# Patient Record
Sex: MALE | Race: BLACK OR AFRICAN AMERICAN | Employment: PART TIME | URBAN - METROPOLITAN AREA
[De-identification: names, ages, dates, MRNs, and addresses within clinical notes are randomized per-mention and may not be internally consistent; named-entity substitution may affect disease eponyms.]

---
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
## 2023-06-08 DIAGNOSIS — L20.84 INTRINSIC ATOPIC DERMATITIS: ICD-10-CM

## 2023-06-08 RX ORDER — DUPILUMAB 300 MG/2ML
INJECTION, SOLUTION SUBCUTANEOUS
Qty: 4 EACH | Refills: 2 | Status: SHIPPED | OUTPATIENT
Start: 2023-06-08

## 2023-06-08 NOTE — TELEPHONE ENCOUNTER
Dedrick Izaguirre is calling to request a refill on the following medication(s):    Medication Request:  Requested Prescriptions     Pending Prescriptions Disp Refills    dupilumab (DUPIXENT) 300 MG/2ML SOSY injection 4 each 2     Sig: Inject 2 mL, subcutaneously, every 2 weeks.        Last Visit Date (If Applicable):  Visit date not found    Next Visit Date:    Visit date not found

## 2024-07-19 ENCOUNTER — TELEPHONE (OUTPATIENT)
Dept: DERMATOLOGY | Age: 19
End: 2024-07-19

## 2024-08-16 ENCOUNTER — OFFICE VISIT (OUTPATIENT)
Dept: DERMATOLOGY | Age: 19
End: 2024-08-16
Payer: COMMERCIAL

## 2024-08-16 VITALS
HEIGHT: 66 IN | DIASTOLIC BLOOD PRESSURE: 67 MMHG | HEART RATE: 81 BPM | BODY MASS INDEX: 23.63 KG/M2 | OXYGEN SATURATION: 97 % | WEIGHT: 147 LBS | TEMPERATURE: 98.2 F | SYSTOLIC BLOOD PRESSURE: 124 MMHG

## 2024-08-16 DIAGNOSIS — L20.84 INTRINSIC ATOPIC DERMATITIS: Primary | ICD-10-CM

## 2024-08-16 DIAGNOSIS — L30.9 FACIAL ECZEMA: ICD-10-CM

## 2024-08-16 PROCEDURE — 99214 OFFICE O/P EST MOD 30 MIN: CPT | Performed by: PHYSICIAN ASSISTANT

## 2024-08-16 RX ORDER — CLOBETASOL PROPIONATE 0.5 MG/G
OINTMENT TOPICAL
Qty: 60 G | Refills: 2 | Status: SHIPPED | OUTPATIENT
Start: 2024-08-16

## 2024-08-16 RX ORDER — TRIAMCINOLONE ACETONIDE 1 MG/G
OINTMENT TOPICAL
Qty: 454 G | Refills: 2 | Status: SHIPPED | OUTPATIENT
Start: 2024-08-16

## 2024-08-16 RX ORDER — HYDROXYZINE HYDROCHLORIDE 10 MG/1
TABLET, FILM COATED ORAL
COMMUNITY
Start: 2024-07-29

## 2024-08-16 RX ORDER — TACROLIMUS 1 MG/G
OINTMENT TOPICAL
Qty: 30 G | Refills: 2 | Status: SHIPPED | OUTPATIENT
Start: 2024-08-16

## 2024-08-16 NOTE — PROGRESS NOTES
Dermatology Patient Note  [facility], [facility] DERMATOLOGY  [address]  [address]  [facility] [address]  Dept: [phone]  Dept Fax: [phone]      VISITDATE: 8/16/2024   REFERRING PROVIDER: No ref. provider found      Aubrey Stuart is a 19 y.o. male  who presents today in the office for:    Other (Patient presents in the office today for what he describes as blisters and bumps on his feet 4 weeks ago. He states these were very painful but no itching. He also has some scaling rough patches on the palm of his hands. These do not itch but they are very uncomfortable for him. He has tried triamcinolone from the ER which he states has helped some. He is also using atarax from them. He was also started on dupixent last Friday. He did take a medro dose pack as well )      HISTORY OF PRESENT ILLNESS:  As above.  90% BSA.  Failing triamcinolone and oral steroids.    MEDICAL PROBLEMS:  There are no problems to display for this patient.      CURRENT MEDICATIONS:   Current Outpatient Medications   Medication Sig Dispense Refill    hydrOXYzine HCl (ATARAX) 10 MG tablet       clobetasol (TEMOVATE) 0.05 % ointment Apply to severely affected areas twice daily, as needed (not face, armpit or groin) 60 g 2    tacrolimus (PROTOPIC) 0.1 % ointment Apply to affected areas of face twice daily, as needed. 30 g 2    triamcinolone (KENALOG) 0.1 % ointment Apply to rash twice daily (not face, armpit or groin) 454 g 2    dupilumab (DUPIXENT) 300 MG/2ML SOSY injection Inject 2 mL, subcutaneously, every 2 weeks. 4 each 2    cetirizine (ZYRTEC) 10 MG tablet Take 1 tablet, 1-2 times daily. 60 tablet 5    chlorhexidine (PERIDEX) 0.12 % solution       EPINEPHrine (EPIPEN) 0.3 MG/0.3ML SOAJ injection       budesonide-formoterol (SYMBICORT) 160-4.5 MCG/ACT AERO Inhale 2 puffs into the lungs      fluticasone (FLONASE) 50 MCG/ACT nasal spray Fluticasone Propionate 50 MCG/ACT Nasal

## 2024-09-16 ENCOUNTER — OFFICE VISIT (OUTPATIENT)
Dept: DERMATOLOGY | Age: 19
End: 2024-09-16
Payer: COMMERCIAL

## 2024-09-16 VITALS
TEMPERATURE: 97.6 F | DIASTOLIC BLOOD PRESSURE: 70 MMHG | SYSTOLIC BLOOD PRESSURE: 129 MMHG | BODY MASS INDEX: 22.98 KG/M2 | HEART RATE: 73 BPM | OXYGEN SATURATION: 96 % | HEIGHT: 66 IN | WEIGHT: 143 LBS

## 2024-09-16 DIAGNOSIS — L20.84 INTRINSIC ATOPIC DERMATITIS: Primary | ICD-10-CM

## 2024-09-16 PROCEDURE — 99213 OFFICE O/P EST LOW 20 MIN: CPT | Performed by: PHYSICIAN ASSISTANT

## 2024-09-26 DIAGNOSIS — L20.84 INTRINSIC ATOPIC DERMATITIS: ICD-10-CM

## 2024-09-26 RX ORDER — TRIAMCINOLONE ACETONIDE 1 MG/G
OINTMENT TOPICAL
Qty: 454 G | Refills: 2 | Status: SHIPPED | OUTPATIENT
Start: 2024-09-26